# Patient Record
Sex: MALE | Race: BLACK OR AFRICAN AMERICAN | NOT HISPANIC OR LATINO | Employment: UNEMPLOYED | URBAN - METROPOLITAN AREA
[De-identification: names, ages, dates, MRNs, and addresses within clinical notes are randomized per-mention and may not be internally consistent; named-entity substitution may affect disease eponyms.]

---

## 2018-10-30 ENCOUNTER — HOSPITAL ENCOUNTER (EMERGENCY)
Facility: HOSPITAL | Age: 33
Discharge: HOME | End: 2018-10-31
Attending: EMERGENCY MEDICINE | Admitting: EMERGENCY MEDICINE
Payer: COMMERCIAL

## 2018-10-30 VITALS
SYSTOLIC BLOOD PRESSURE: 126 MMHG | HEART RATE: 73 BPM | BODY MASS INDEX: 20.07 KG/M2 | HEIGHT: 72 IN | DIASTOLIC BLOOD PRESSURE: 64 MMHG | TEMPERATURE: 97.2 F | OXYGEN SATURATION: 98 % | RESPIRATION RATE: 16 BRPM | WEIGHT: 148.15 LBS

## 2018-10-30 DIAGNOSIS — N39.0 UTI (URINARY TRACT INFECTION): ICD-10-CM

## 2018-10-30 DIAGNOSIS — R30.0 DYSURIA: Primary | ICD-10-CM

## 2018-10-30 PROCEDURE — 87086 URINE CULTURE/COLONY COUNT: CPT | Performed by: EMERGENCY MEDICINE

## 2018-10-30 PROCEDURE — 99284 EMERGENCY DEPT VISIT MOD MDM: CPT

## 2018-10-30 PROCEDURE — 81003 URINALYSIS AUTO W/O SCOPE: CPT | Performed by: EMERGENCY MEDICINE

## 2018-10-30 PROCEDURE — 81001 URINALYSIS AUTO W/SCOPE: CPT | Performed by: EMERGENCY MEDICINE

## 2018-10-31 LAB
BACTERIA UR QL AUTO: ABNORMAL /HPF
BILIRUB UR QL STRIP: NEGATIVE
CLARITY UR: CLEAR
COLOR UR: YELLOW
GLUCOSE UR STRIP-MCNC: NEGATIVE MG/DL
HGB UR QL STRIP.AUTO: NEGATIVE
KETONES UR STRIP-MCNC: NEGATIVE MG/DL
LEUKOCYTE ESTERASE UR QL STRIP: 500
NITRITE UR QL STRIP: NEGATIVE
NON-SQ EPI CELLS URNS QL MICRO: ABNORMAL /HPF
PH UR STRIP.AUTO: 6.5 [PH] (ref 4.5–8)
PROT UR STRIP-MCNC: NEGATIVE MG/DL
RBC #/AREA URNS AUTO: ABNORMAL /HPF
SP GR UR STRIP.AUTO: 1.01 (ref 1–1.04)
UROBILINOGEN UA: 1 MG/DL
WBC #/AREA URNS AUTO: ABNORMAL /HPF

## 2018-10-31 RX ORDER — CEPHALEXIN 500 MG/1
500 CAPSULE ORAL 3 TIMES DAILY
Qty: 15 CAPSULE | Refills: 0 | Status: SHIPPED | OUTPATIENT
Start: 2018-10-31 | End: 2018-11-05

## 2018-10-31 RX ORDER — CEPHALEXIN 500 MG/1
CAPSULE ORAL
Status: COMPLETED
Start: 2018-10-31 | End: 2018-10-31

## 2018-10-31 RX ORDER — CEPHALEXIN 500 MG/1
500 CAPSULE ORAL ONCE
Status: COMPLETED | OUTPATIENT
Start: 2018-10-31 | End: 2018-10-31

## 2018-10-31 RX ADMIN — CEPHALEXIN 500 MG: 500 CAPSULE ORAL at 00:25

## 2018-10-31 NOTE — ED PROVIDER NOTES
History  Chief Complaint   Patient presents with    Painful Urination    Abdominal Pain     35 yo male who has had 2 days of intermittent dysuria - more towards end of urination  Denies frequency or flank pain, no fevers or chills  Mild suprapubic pressure  History provided by:  Patient  Difficulty Urinating   Presenting symptoms: dysuria    Context: after urination and during urination    Relieved by:  Nothing  Worsened by:  Urination  Ineffective treatments:  None tried  Associated symptoms: abdominal pain (suprapubic pressure)    Associated symptoms: no diarrhea, no fever, no flank pain, no genital itching, no genital lesions, no genital rash, no groin pain, no hematuria, no nausea, no penile redness, no scrotal swelling, no urinary frequency, no urinary hesitation, no urinary incontinence, no urinary retention and no vomiting        None       Past Medical History:   Diagnosis Date    Anxiety     Asthma        Past Surgical History:   Procedure Laterality Date    NO PAST SURGERIES         History reviewed  No pertinent family history  I have reviewed and agree with the history as documented  Social History   Substance Use Topics    Smoking status: Never Smoker    Smokeless tobacco: Never Used    Alcohol use Yes      Comment: socially        Review of Systems   Constitutional: Negative for chills and fever  HENT: Negative for congestion and sore throat  Eyes: Negative for visual disturbance  Respiratory: Negative for shortness of breath and wheezing  Cardiovascular: Negative for chest pain and palpitations  Gastrointestinal: Positive for abdominal pain (suprapubic pressure)  Negative for diarrhea, nausea and vomiting  Genitourinary: Positive for dysuria  Negative for bladder incontinence, flank pain, frequency, hematuria, hesitancy and scrotal swelling  Musculoskeletal: Negative for neck pain and neck stiffness  Skin: Negative for pallor and rash     Neurological: Negative for headaches  Psychiatric/Behavioral: Negative for confusion  All other systems reviewed and are negative  Physical Exam  Physical Exam   Constitutional: He is oriented to person, place, and time  He appears well-developed and well-nourished  No distress  HENT:   Head: Normocephalic and atraumatic  Mouth/Throat: Oropharynx is clear and moist    Neck: Neck supple  Cardiovascular: Normal rate and regular rhythm  No murmur heard  Pulmonary/Chest: Effort normal and breath sounds normal    Abdominal: Soft  Bowel sounds are normal  He exhibits no distension  There is tenderness (mild surapubic)  Musculoskeletal: Normal range of motion  He exhibits no edema  No CVA tenderness   Neurological: He is alert and oriented to person, place, and time  Skin: Skin is warm  Capillary refill takes less than 2 seconds  No rash noted  No pallor  Psychiatric: He has a normal mood and affect  His behavior is normal    Nursing note and vitals reviewed  Vital Signs  ED Triage Vitals [10/30/18 2334]   Temperature Pulse Respirations Blood Pressure SpO2   (!) 97 2 °F (36 2 °C) 73 16 126/64 98 %      Temp Source Heart Rate Source Patient Position - Orthostatic VS BP Location FiO2 (%)   Tympanic Monitor Lying Left arm --      Pain Score       4           Vitals:    10/30/18 2334   BP: 126/64   Pulse: 73   Patient Position - Orthostatic VS: Lying       Visual Acuity      ED Medications  Medications   cephalexin (KEFLEX) capsule 500 mg (500 mg Oral Given 10/31/18 0025)       Diagnostic Studies  Results Reviewed     Procedure Component Value Units Date/Time    Urine Microscopic [[id number]]  (Abnormal) Collected:  10/30/18 2351    Lab Status:  Final result Specimen:  Urine from Urine, Clean Catch Updated:  10/31/18 0010     RBC, UA None Seen /hpf      WBC, UA 10-20 (A) /hpf      Epithelial Cells Occasional /hpf      Bacteria, UA Occasional /hpf     Urine culture [[id number]] Collected:  10/30/18 2351    Lab Status:   In process Specimen:  Urine from Urine, Clean Catch Updated:  10/31/18 0009    UA w Reflex to Microscopic w Reflex to Culture [[id number]]  (Abnormal) Collected:  10/30/18 1451    Lab Status:  Final result Specimen:  Urine from Urine, Clean Catch Updated:  10/31/18 0007     Color, UA Yellow     Clarity, UA Clear     Specific Gravity, UA 1 015     pH, UA 6 5     Leukocytes,  0 (A)     Nitrite, UA Negative     Protein, UA Negative mg/dl      Glucose, UA Negative mg/dl      Ketones, UA Negative mg/dl      Bilirubin, UA Negative     Blood, UA Negative     UROBILINOGEN UA 1 0 mg/dL                  No orders to display              Procedures  Procedures       Phone Contacts  ED Phone Contact    ED Course  ED Course as of Oct 31 0232   Tue Oct 30, 2018   2338 Pt seen and examined  35 yo male who has had 2 days of intermittent dysuria - more towards end of urination  Denies frequency or flank pain, no fevers or chills  Mild suprapubic pressure  Will dip urine  Sexually active with 1 female partner - no known STD's  Wed Oct 31, 2018   0009 Luekocyts 500 - will treat with keflex  MDM  CritCare Time    Disposition  Final diagnoses:   Dysuria   UTI (urinary tract infection)     Time reflects when diagnosis was documented in both MDM as applicable and the Disposition within this note     Time User Action Codes Description Comment    10/31/2018 12:09 AM Robinson BROWER Add [R30 0] Dysuria     10/31/2018 12:09 AM Robinson BROWER Add [N39 0] UTI (urinary tract infection)       ED Disposition     ED Disposition Condition Comment    Discharge  Elsie Crain discharge to home/self care      Condition at discharge: Good        Follow-up Information     Follow up With Specialties Details Why Contact Info Additional [address] [facility] Schedule an appointment as soon as possible for a visit As needed [address] [facility] [address] [facility] ADONIS Many [address] [address]  [address]  [facility] [address], [place], [place], [address]          Discharge Medication List as of 10/31/2018 12:10 AM      START taking these medications    Details   cephalexin (KEFLEX) 500 mg capsule Take 1 capsule (500 mg total) by mouth 3 (three) times a day for 5 days, Starting Wed 10/31/2018, Until Mon 11/5/2018, Print           No discharge procedures on file      ED Provider  Electronically Signed by           Romeo Licona DO  10/31/18 0218

## 2018-10-31 NOTE — DISCHARGE INSTRUCTIONS
Dysuria   WHAT YOU NEED TO KNOW:   Dysuria is difficulty urinating, or pain, burning, or discomfort with urination  Dysuria is usually a symptom of another problem  DISCHARGE INSTRUCTIONS:   Return to the emergency department if:   · You have severe back, side, or abdominal pain  · You have fever and shaking chills  · You vomit several times in a row  Contact your healthcare provider if:   · Your symptoms do not go away, even after treatment  · You have questions or concerns about your condition or care  Medicines:   · Medicines  may be given to help treat a bacterial infection or help decrease bladder spasms  · Take your medicine as directed  Contact your healthcare provider if you think your medicine is not helping or if you have side effects  Tell him of her if you are allergic to any medicine  Keep a list of the medicines, vitamins, and herbs you take  Include the amounts, and when and why you take them  Bring the list or the pill bottles to follow-up visits  Carry your medicine list with you in case of an emergency  Follow up with your healthcare provider as directed: Your healthcare provider may also refer you to a urologist or nephrologist to have additional testing  Write down your questions so you remember to ask them during your visits  Manage your dysuria:   · Drink more liquids  Liquids help flush out bacteria that may be causing an infection  Ask your healthcare provider how much liquid to drink each day and which liquids are best for you  · Take sitz baths as directed  Fill a bathtub with 4 to 6 inches of warm water  You may also use a sitz bath pan that fits over a toilet  Sit in the sitz bath for 20 minutes  Do this 2 to 3 times a day, or as directed  The warm water can help decrease pain and swelling  © 2017 [employer] Information is for End User's use only and may not be sold, redistributed or otherwise used for commercial purposes   All illustrations and images included in CareNotes® are the copyrighted property of A D A M , Inc  or Chris Menon  The above information is an  only  It is not intended as medical advice for individual conditions or treatments  Talk to your doctor, nurse or pharmacist before following any medical regimen to see if it is safe and effective for you  Urinary Tract Infection in Men   WHAT YOU NEED TO KNOW:   A urinary tract infection (UTI) is caused by bacteria that get inside your urinary tract  Most bacteria that enter your urinary tract come out when you urinate  If the bacteria stay in your urinary tract, you may get an infection  Your urinary tract includes your kidneys, ureters, bladder, and urethra  Urine is made in your kidneys, and it flows from the ureters to the bladder  Urine leaves the bladder through the urethra  A UTI is more common in your lower urinary tract, which includes your bladder and urethra  DISCHARGE INSTRUCTIONS:   Seek care immediately if:   · You are urinating very little or not at all  · You have a high fever with shaking chills  · You have side or back pain that gets worse  Contact your healthcare provider if:   · You have a mild fever  · You do not feel better after 2 days of taking antibiotics  · You are vomiting  · You have new symptoms, such as blood or pus in your urine  · You have questions or concerns about your condition or care  Medicines:   · Antibiotics  help fight a bacterial infection  · Medicines  may be given to decrease pain and burning when you urinate  They will also help decrease the feeling that you need to urinate often  These medicines will make your urine orange or red  · Take your medicine as directed  Contact your healthcare provider if you think your medicine is not helping or if you have side effects  Tell him or her if you are allergic to any medicine  Keep a list of the medicines, vitamins, and herbs you take  Include the amounts, and when and why you take them  Bring the list or the pill bottles to follow-up visits  Carry your medicine list with you in case of an emergency  Prevent another UTI:   · Empty your bladder often  Urinate and empty your bladder as soon as you feel the need  Do not hold your urine for long periods of time  · Drink liquids as directed  Ask how much liquid to drink each day and which liquids are best for you  You may need to drink more liquids than usual to help flush out the bacteria  Do not drink alcohol, caffeine, or citrus juices  These can irritate your bladder and increase your symptoms  Your healthcare provider may recommend cranberry juice to help prevent a UTI  · Urinate after you have sex  This can help flush out bacteria passed during sex  · Do pelvic muscle exercises often  Pelvic muscle exercises may help you start and stop urinating  Strong pelvic muscles may help you empty your bladder easier  Squeeze these muscles tightly for 5 seconds like you are trying to hold back urine  Then relax for 5 seconds  Gradually work up to squeezing for 10 seconds  Do 3 sets of 15 repetitions a day, or as directed  Follow up with your healthcare provider as directed:  Write down your questions so you remember to ask them during your visits  © 2017 [employer]  Information is for End User's use only and may not be sold, redistributed or otherwise used for commercial purposes  All illustrations and images included in CareNotes® are the copyrighted property of A D A M , Inc  or Chris Menon  The above information is an  only  It is not intended as medical advice for individual conditions or treatments  Talk to your doctor, nurse or pharmacist before following any medical regimen to see if it is safe and effective for you

## 2018-10-31 NOTE — ED TRIAGE NOTES
Patient state's that he has pain with urination, it is a burning type pain  He also has lower abdominal pain that started this morning, denies N/V/D, is able to eat and drink, moved his bowels today and moves them daily

## 2018-11-01 LAB — BACTERIA UR CULT: NORMAL

## 2019-01-24 ENCOUNTER — APPOINTMENT (EMERGENCY)
Dept: RADIOLOGY | Facility: HOSPITAL | Age: 34
End: 2019-01-24

## 2019-01-24 ENCOUNTER — HOSPITAL ENCOUNTER (EMERGENCY)
Facility: HOSPITAL | Age: 34
Discharge: HOME | End: 2019-01-24
Attending: EMERGENCY MEDICINE | Admitting: EMERGENCY MEDICINE
Payer: COMMERCIAL

## 2019-01-24 VITALS
DIASTOLIC BLOOD PRESSURE: 78 MMHG | TEMPERATURE: 97.9 F | BODY MASS INDEX: 20.34 KG/M2 | RESPIRATION RATE: 18 BRPM | WEIGHT: 150 LBS | SYSTOLIC BLOOD PRESSURE: 126 MMHG | OXYGEN SATURATION: 99 % | HEART RATE: 70 BPM

## 2019-01-24 DIAGNOSIS — R07.9 CHEST PAIN: ICD-10-CM

## 2019-01-24 DIAGNOSIS — R45.0 NERVOUS: Primary | ICD-10-CM

## 2019-01-24 LAB
ALBUMIN SERPL BCP-MCNC: 4.5 G/DL (ref 3.5–5)
ALP SERPL-CCNC: 96 U/L (ref 46–116)
ALT SERPL W P-5'-P-CCNC: 22 U/L (ref 12–78)
ANION GAP SERPL CALCULATED.3IONS-SCNC: 7 MMOL/L (ref 4–13)
AST SERPL W P-5'-P-CCNC: 20 U/L (ref 5–45)
ATRIAL RATE: 60 BPM
BASOPHILS # BLD AUTO: 0.03 THOUSANDS/ΜL (ref 0–0.1)
BASOPHILS NFR BLD AUTO: 0 % (ref 0–1)
BILIRUB SERPL-MCNC: 0.88 MG/DL (ref 0.2–1)
BUN SERPL-MCNC: 12 MG/DL (ref 5–25)
CALCIUM SERPL-MCNC: 9.2 MG/DL (ref 8.3–10.1)
CHLORIDE SERPL-SCNC: 103 MMOL/L (ref 100–108)
CO2 SERPL-SCNC: 28 MMOL/L (ref 21–32)
CREAT SERPL-MCNC: 1.13 MG/DL (ref 0.6–1.3)
EOSINOPHIL # BLD AUTO: 0.1 THOUSAND/ΜL (ref 0–0.61)
EOSINOPHIL NFR BLD AUTO: 1 % (ref 0–6)
ERYTHROCYTE [DISTWIDTH] IN BLOOD BY AUTOMATED COUNT: 12.8 % (ref 11.6–15.1)
GFR SERPL CREATININE-BSD FRML MDRD: 100 ML/MIN/1.73SQ M
GLUCOSE SERPL-MCNC: 82 MG/DL (ref 65–140)
HCT VFR BLD AUTO: 46.1 % (ref 36.5–49.3)
HGB BLD-MCNC: 15.1 G/DL (ref 12–17)
IMM GRANULOCYTES # BLD AUTO: 0.02 THOUSAND/UL (ref 0–0.2)
IMM GRANULOCYTES NFR BLD AUTO: 0 % (ref 0–2)
LYMPHOCYTES # BLD AUTO: 2.52 THOUSANDS/ΜL (ref 0.6–4.47)
LYMPHOCYTES NFR BLD AUTO: 34 % (ref 14–44)
MCH RBC QN AUTO: 29.1 PG (ref 26.8–34.3)
MCHC RBC AUTO-ENTMCNC: 32.8 G/DL (ref 31.4–37.4)
MCV RBC AUTO: 89 FL (ref 82–98)
MONOCYTES # BLD AUTO: 0.69 THOUSAND/ΜL (ref 0.17–1.22)
MONOCYTES NFR BLD AUTO: 9 % (ref 4–12)
NEUTROPHILS # BLD AUTO: 4.1 THOUSANDS/ΜL (ref 1.85–7.62)
NEUTS SEG NFR BLD AUTO: 56 % (ref 43–75)
NRBC BLD AUTO-RTO: 0 /100 WBCS
P AXIS: 66 DEGREES
PLATELET # BLD AUTO: 273 THOUSANDS/UL (ref 149–390)
PMV BLD AUTO: 9.7 FL (ref 8.9–12.7)
POTASSIUM SERPL-SCNC: 4.1 MMOL/L (ref 3.5–5.3)
PR INTERVAL: 148 MS
PROT SERPL-MCNC: 8.1 G/DL (ref 6.4–8.2)
QRS AXIS: 81 DEGREES
QRSD INTERVAL: 80 MS
QT INTERVAL: 410 MS
QTC INTERVAL: 410 MS
RBC # BLD AUTO: 5.19 MILLION/UL (ref 3.88–5.62)
SODIUM SERPL-SCNC: 138 MMOL/L (ref 136–145)
T WAVE AXIS: 65 DEGREES
TROPONIN I SERPL-MCNC: <0.02 NG/ML
TROPONIN I SERPL-MCNC: <0.02 NG/ML
VENTRICULAR RATE: 60 BPM
WBC # BLD AUTO: 7.46 THOUSAND/UL (ref 4.31–10.16)

## 2019-01-24 PROCEDURE — 93010 ELECTROCARDIOGRAM REPORT: CPT | Performed by: INTERNAL MEDICINE

## 2019-01-24 PROCEDURE — 36415 COLL VENOUS BLD VENIPUNCTURE: CPT

## 2019-01-24 PROCEDURE — 93005 ELECTROCARDIOGRAM TRACING: CPT

## 2019-01-24 PROCEDURE — 96372 THER/PROPH/DIAG INJ SC/IM: CPT

## 2019-01-24 PROCEDURE — 84484 ASSAY OF TROPONIN QUANT: CPT | Performed by: EMERGENCY MEDICINE

## 2019-01-24 PROCEDURE — 71046 X-RAY EXAM CHEST 2 VIEWS: CPT

## 2019-01-24 PROCEDURE — 80053 COMPREHEN METABOLIC PANEL: CPT | Performed by: EMERGENCY MEDICINE

## 2019-01-24 PROCEDURE — 85025 COMPLETE CBC W/AUTO DIFF WBC: CPT | Performed by: EMERGENCY MEDICINE

## 2019-01-24 PROCEDURE — 99285 EMERGENCY DEPT VISIT HI MDM: CPT

## 2019-01-24 RX ORDER — KETOROLAC TROMETHAMINE 30 MG/ML
15 INJECTION, SOLUTION INTRAMUSCULAR; INTRAVENOUS ONCE
Status: COMPLETED | OUTPATIENT
Start: 2019-01-24 | End: 2019-01-24

## 2019-01-24 RX ADMIN — KETOROLAC TROMETHAMINE 15 MG: 30 INJECTION, SOLUTION INTRAMUSCULAR at 18:05

## 2019-01-24 NOTE — DISCHARGE INSTRUCTIONS

## 2019-01-24 NOTE — ED ATTENDING ATTESTATION
Jackie Gomes MD, saw and evaluated the patient  All available labs and X-rays were ordered by me or the resident and have been reviewed by myself  I discussed the patient with the resident / non-physician and agree with the resident's / non-physician practitioner's findings and plan as documented in the resident's / non-physician practicitioner's note, except where noted  At this point, I agree with the current assessment done in the ED  Chief Complaint   Patient presents with    Chest Pain     patient reports severe chest pain earlier today that is now resolving    Neck Pain     patient reports neck pain for about a year    Anxiety     patient states he was diagnosed with anxiety a year ago and feels this is still an ongoing problem     This is a 33 y/o M presenting for CP since 9am related to anxiety  Hx of similar  Feels anxious b/c he is in holding, arrested for not paying child support  He was sitting in his bunk and had left chest pain, currently 2/10, at worst 9/10  Seen by PCP in past for similar, todl it was anxiety  No f/ch/n/v  +neck pain  +back pain  He felt mildly SOB earlier today 2/2 the pain  No dizziness/Lh  No diaphoresis  No numbness or tingling down the arms or legs  FH:  - none for sudden death  PMH:  - No hx of VTE   - Anxiety  PSH:  - None  Denies smoking, drinking, drugs  PE:  Vitals:    01/24/19 1549 01/24/19 1846   BP: 130/84 126/78   BP Location: Right arm Right arm   Pulse: 72 70   Resp: 18 18   Temp: 97 9 °F (36 6 °C)    TempSrc: Tympanic    SpO2: 99% 99%   Weight: 68 kg (150 lb)    General: VSS, NAD, awake, alert  Well-nourished, well-developed  Appears stated age  Speaking normally in full sentences  Head: Normocephalic, atraumatic, nontender  Eyes: PERRL, EOM-I  No diplopia  No hyphema  No subconjunctival hemorrhages  Symmetrical lids  ENT: Atraumatic external nose and ears  MMM  No malocclusion  No stridor  Normal phonation  No drooling   Normal swallowing  Neck: Symmetric, trachea midline  No JVD  CV: RRR  +S1/S2  No murmurs or gallops  Peripheral pulses +2 throughout  No chest wall tenderness  Lungs:   Unlabored No retractions  CTAB, lungs sounds equal bilateral    No tachypnea  Abd: +BS, soft, NT/ND    MSK:   FROM   Back:   No rashes  Skin: Dry, intact  Neuro: AAOx3, GCS 15, CN II-XII grossly intact  Motor grossly intact  Psychiatric/Behavioral: Appropriate mood and affect   Exam: deferred  A:  - CP  P:  - Sounds like anxiety  - The patient is less than 50, has an initial HR < 100, O2 Saturation >95%, no hx of previous VTE, no recent trauma or surgery within 4 weeks, no hemoptylsis, and is not on any exogenous estrogen  The patient has no need for further workup of PE and has  <2% chance of PE  My initial pre-test probability of PE is <15% and PERC Rule criteria are satisfied   - EKG, cardiac  - likeyl DC   - 13 point ROS was performed and all are normal unless stated in the history above  - Nursing note reviewed  Vitals reviewed  - Orders placed by myself and/or advanced practitioner / resident     - Previous chart was reviewed  - No language barrier    - History obtained from patient  - There are no limitations to the history obtained  - Critical care time: Not applicable for this patient  HEART Score = [0]  [0] History = Highly / Moderately / Slightly Suspicious  [0] EKG = Significant STD / Non-specific repolarization / Normal  [0] Age = >65, 45-65, <45  [0] Risk Factors (0, 1-2, 3+): Cholesterol, HTN, DM, Cigarettes, FH, Obesity  [0] Troponin: 3+ x normal, 1-3x normal, <normal    Low Score (0-3 points), risk of MACE of 0 9-1 7%      Procedure Note: EKG  Date/Time: 01/24/19 5:08 PM   Performed by: Khai Tilley  Authorized by: Khai Tilley   Indications / Diagnosis: CP  ECG reviewed by me, the ED Provider: yes   The EKG demonstrates:  Rhythm: HR 60 SRINIVAS normal sinus  Intervals: normal intervals  Axis: normal axis  QRS/Blocks: normal QRS  ST Changes: No acute ST Changes, no STD/STEPIHE  PE risk, Wells score = [0]   (0) clinically suspected DVT - 3 points   (0) alternative diagnosis is less likely than PE - 3 0 points   (0) tachycardia - 1 5 points   (0) immobilization/surgery in previous four weeks - 1 5 points   (0) history of DVT or PE - 1 5 points   (0) hemoptysis - 1 0 points   (0) malignancy (treatment for within 6 months, palliative) - 1 0 points   Interpretation Miguel Muñoz et al  2007 Radiology [id number]):   Score <2 0 - Low (probability 15% based on pooled data)     Final Diagnosis:  1  Nervous    2  Chest pain           Medications   ketorolac (TORADOL) injection 15 mg (15 mg Intramuscular Given 1/24/19 1805)     X-ray chest 2 views   ED Interpretation   No effusions or consolidations bilaterally      Final Result      Normal examination  Workstation performed: [id number]           Orders Placed This Encounter   Procedures    ED ECG Documentation Only    X-ray chest 2 views    Comprehensive metabolic panel    CBC and differential    Troponin I    Troponin I    EKG RESULTS    ECG 12 lead    ECG 12 lead     Labs Reviewed   TROPONIN I - Normal       Result Value Ref Range Status    Troponin I <0 02  <=0 04 ng/mL Final    Comment:   [employer] Chemistry analyzer 99% cutoff is > 0 04 ng/mL in network labs     o cTnI 99% cutoff is useful only when applied to patients in the clinical setting of myocardial ischemia   o cTnI 99% cutoff should be interpreted in the context of clinical history, ECG findings and possibly cardiac imaging to establish correct diagnosis  o cTnI 99% cutoff may be suggestive but clearly not indicative of a coronary event without the clinical setting of myocardial ischemia       TROPONIN I - Normal    Troponin I <0 02  <=0 04 ng/mL Final    Comment:   [employer]ens Chemistry analyzer 99% cutoff is > 0 04 ng/mL in network labs     o cTnI 99% cutoff is useful only when applied to patients in the clinical setting of myocardial ischemia   o cTnI 99% cutoff should be interpreted in the context of clinical history, ECG findings and possibly cardiac imaging to establish correct diagnosis  o cTnI 99% cutoff may be suggestive but clearly not indicative of a coronary event without the clinical setting of myocardial ischemia  COMPREHENSIVE METABOLIC PANEL    Sodium 946  136 - 145 mmol/L Final    Potassium 4 1  3 5 - 5 3 mmol/L Final    Chloride 103  100 - 108 mmol/L Final    CO2 28  21 - 32 mmol/L Final    ANION GAP 7  4 - 13 mmol/L Final    BUN 12  5 - 25 mg/dL Final    Creatinine 1 13  0 60 - 1 30 mg/dL Final    Comment: Standardized to IDMS reference method    Glucose 82  65 - 140 mg/dL Final    Comment:   If the patient is fasting, the ADA then defines impaired fasting glucose as > 100 mg/dL and diabetes as > or equal to 123 mg/dL  Specimen collection should occur prior to Sulfasalazine administration due to the potential for falsely depressed results  Specimen collection should occur prior to Sulfapyridine administration due to the potential for falsely elevated results  Calcium 9 2  8 3 - 10 1 mg/dL Final    AST 20  5 - 45 U/L Final    Comment:   Specimen collection should occur prior to Sulfasalazine administration due to the potential for falsely depressed results  ALT 22  12 - 78 U/L Final    Comment:   Specimen collection should occur prior to Sulfasalazine and/or Sulfapyridine administration due to the potential for falsely depressed results       Alkaline Phosphatase 96  46 - 116 U/L Final    Total Protein 8 1  6 4 - 8 2 g/dL Final    Albumin 4 5  3 5 - 5 0 g/dL Final    Total Bilirubin 0 88  0 20 - 1 00 mg/dL Final    eGFR 100  ml/min/1 73sq m Final    Narrative:     National Kidney Disease Education Program recommendations are as follows:  GFR calculation is accurate only with a steady state creatinine  Chronic Kidney disease less than 60 ml/min/1 73 sq  meters  Kidney failure less than 15 ml/min/1 73 sq  meters  CBC AND DIFFERENTIAL    WBC 7 46  4 31 - 10 16 Thousand/uL Final    RBC 5 19  3 88 - 5 62 Million/uL Final    Hemoglobin 15 1  12 0 - 17 0 g/dL Final    Hematocrit 46 1  36 5 - 49 3 % Final    MCV 89  82 - 98 fL Final    MCH 29 1  26 8 - 34 3 pg Final    MCHC 32 8  31 4 - 37 4 g/dL Final    RDW 12 8  11 6 - 15 1 % Final    MPV 9 7  8 9 - 12 7 fL Final    Platelets 816  492 - 390 Thousands/uL Final    nRBC 0  /100 WBCs Final    Neutrophils Relative 56  43 - 75 % Final    Immat GRANS % 0  0 - 2 % Final    Lymphocytes Relative 34  14 - 44 % Final    Monocytes Relative 9  4 - 12 % Final    Eosinophils Relative 1  0 - 6 % Final    Basophils Relative 0  0 - 1 % Final    Neutrophils Absolute 4 10  1 85 - 7 62 Thousands/µL Final    Immature Grans Absolute 0 02  0 00 - 0 20 Thousand/uL Final    Lymphocytes Absolute 2 52  0 60 - 4 47 Thousands/µL Final    Monocytes Absolute 0 69  0 17 - 1 22 Thousand/µL Final    Eosinophils Absolute 0 10  0 00 - 0 61 Thousand/µL Final    Basophils Absolute 0 03  0 00 - 0 10 Thousands/µL Final   LIGHT BLUE TOP   GOLD TOP ON HOLD     Time reflects when diagnosis was documented in both MDM as applicable and the Disposition within this note     Time User Action Codes Description Comment    1/24/2019  6:45 PM Sierra Pearl Add [R45 0] Nervous     1/24/2019  6:46 PM Dariela Castellano Add [R07 9] Chest pain       ED Disposition     ED Disposition Condition Comment    Discharge  Elsie Crain discharge to home/self care  Condition at discharge: Good      Return precautions were discussed with patient  Patient understands when to return to  Emergency department  Patient agrees to discharge plan and follow up care             Follow-up Information     Follow up With Specialties Details Why Contact Info Additional [address] [facility], DO Internal Medicine Go in 1 day  [address] [facility] Ms[address] Nikhil Palomo [facility] Emergency Department Emergency Medicine Go to As needed, If symptoms worsen [address]  [phone]  ED, [address] [address], [place], [place], [address]        There are no discharge medications for this patient  No discharge procedures on file  None       Portions of the record may have been created with voice recognition software  Occasional wrong word or "sound a like" substitutions may have occurred due to the inherent limitations of voice recognition software  Read the chart carefully and recognize, using context, where substitutions have occurred      Electronically signed by:  Jacque Haney

## 2019-01-24 NOTE — ED PROVIDER NOTES
History  Chief Complaint   Patient presents with    Chest Pain     patient reports severe chest pain earlier today that is now resolving    Neck Pain     patient reports neck pain for about a year    Anxiety     patient states he was diagnosed with anxiety a year ago and feels this is still an ongoing problem     HPI    81-year-old male with history of anxiety presenting with chief complaint chest pain  Patient states he is in holding for not playing his child support  Patient presents from police [facility]  Patient states chest pain began today while he was sitting on a bunk  This approximately started at 8:00 a m  Aminta Blu Patient describes right going stabbing chest pain  It does not radiate  Pain is going away  At [address] slowly built to severe nature now is getting better  Patient did not tried medications  Patient also complains of some neck pain  This has been going on for 1 year  Comes and goes  It is worse with lifting  Patient states that Tylenol makes the pain better  Pain does not radiate  No steroid use  No IV drug use  No numbness or tingling or motor weakness  Patient states he feels right anxious about being under charges for not paying child abuse  Patient thinks this might be is anxiety  Patient was evaluated by primary care physician  was told this chest pain is related to his anxiety  Patient denies fever chills rigors headache lightheadedness dizziness palpitations shortness of breath cough pleurisy abdominal pain nausea vomiting diarrhea constipation urinary symptoms motor weakness numbness and tingling  Patient denies diaphoresis  No history of VTE  Family history is negative for early ACS  No worsening of pain on exertion  None       Past Medical History:   Diagnosis Date    Anxiety     Anxiety     Asthma        Past Surgical History:   Procedure Laterality Date    KNEE SURGERY      NO PAST SURGERIES         History reviewed   No pertinent family history  I have reviewed and agree with the history as documented  Social History   Substance Use Topics    Smoking status: Never Smoker    Smokeless tobacco: Never Used    Alcohol use Yes      Comment: socially        Review of Systems   Respiratory: Negative for apnea, cough, choking, chest tightness, shortness of breath, wheezing and stridor  Cardiovascular: Positive for chest pain  Negative for palpitations and leg swelling  Psychiatric/Behavioral: The patient is nervous/anxious  All other systems reviewed and are negative  Physical Exam  ED Triage Vitals [01/24/19 1549]   Temperature Pulse Respirations Blood Pressure SpO2   97 9 °F (36 6 °C) 72 18 130/84 99 %      Temp Source Heart Rate Source Patient Position - Orthostatic VS BP Location FiO2 (%)   Tympanic Monitor Sitting Right arm --      Pain Score       7           Orthostatic Vital Signs  Vitals:    01/24/19 1549 01/24/19 1846   BP: 130/84 126/78   Pulse: 72 70   Patient Position - Orthostatic VS: Sitting Sitting       Physical Exam   Constitutional: He is oriented to person, place, and time  He appears well-developed and well-nourished  No distress  HENT:   Head: Normocephalic and atraumatic  Right Ear: External ear normal    Left Ear: External ear normal    Nose: Nose normal    Mouth/Throat: Oropharynx is clear and moist  No oropharyngeal exudate  Eyes: Pupils are equal, round, and reactive to light  Conjunctivae and EOM are normal  Right eye exhibits no discharge  Left eye exhibits no discharge  No scleral icterus  Neck: Normal range of motion  Neck supple  No JVD present  No tracheal deviation present  No thyromegaly present  Cardiovascular: Normal rate, regular rhythm, normal heart sounds and intact distal pulses  No murmur heard  Pulmonary/Chest: Effort normal and breath sounds normal  No stridor  No respiratory distress  He has no wheezes  He has no rales  Abdominal: Soft   Bowel sounds are normal  He exhibits no distension and no mass  There is no tenderness  There is no rebound and no guarding  Musculoskeletal: Normal range of motion  He exhibits no edema, tenderness or deformity  Arms:  Homans sign negative bilaterally, no calf erythema or edema  Calves are symmetrical in diameter  Lymphadenopathy:     He has no cervical adenopathy  Neurological: He is alert and oriented to person, place, and time  No cranial nerve deficit  He exhibits normal muscle tone  Coordination normal  GCS eye subscore is 4  GCS verbal subscore is 5  GCS motor subscore is 6  Reflex Scores:       Tricep reflexes are 2+ on the right side and 2+ on the left side  Bicep reflexes are 2+ on the right side and 2+ on the left side  Patellar reflexes are 2+ on the right side and 2+ on the left side  Achilles reflexes are 2+ on the right side and 2+ on the left side  5/5 strength in upper lower extremities, sensation intact throughout, cerebellar testing including finger-to-nose heel-to-shin rapid alternating movements are intact, cranial nerves 2-12 intact, no pronator drift, patient is able to walk without difficulty, speech is articulate visual fields are intact  Skin: Skin is warm and dry  No rash noted  He is not diaphoretic  No erythema  Psychiatric: He has a normal mood and affect  His behavior is normal  Judgment and thought content normal    Nursing note and vitals reviewed        ED Medications  Medications   ketorolac (TORADOL) injection 15 mg (15 mg Intramuscular Given 1/24/19 1805)       Diagnostic Studies  Results Reviewed     Procedure Component Value Units Date/Time    Troponin I [[id number]]  (Normal) Collected:  01/24/19 1806    Lab Status:  Final result Specimen:  Blood from Arm, Left Updated:  01/24/19 1835     Troponin I <0 02 ng/mL     Comprehensive metabolic panel [[id number]] Collected:  01/24/19 1609    Lab Status:  Final result Specimen:  Blood from Arm, Left Updated:  01/24/19 1638     Sodium 138 mmol/L      Potassium 4 1 mmol/L      Chloride 103 mmol/L      CO2 28 mmol/L      ANION GAP 7 mmol/L      BUN 12 mg/dL      Creatinine 1 13 mg/dL      Glucose 82 mg/dL      Calcium 9 2 mg/dL      AST 20 U/L      ALT 22 U/L      Alkaline Phosphatase 96 U/L      Total Protein 8 1 g/dL      Albumin 4 5 g/dL      Total Bilirubin 0 88 mg/dL      eGFR 100 ml/min/1 73sq m     Narrative:         National Kidney Disease Education Program recommendations are as follows:  GFR calculation is accurate only with a steady state creatinine  Chronic Kidney disease less than 60 ml/min/1 73 sq  meters  Kidney failure less than 15 ml/min/1 73 sq  meters  Troponin I [[id number]]  (Normal) Collected:  01/24/19 1609    Lab Status:  Final result Specimen:  Blood from Arm, Left Updated:  01/24/19 1638     Troponin I <0 02 ng/mL     CBC and differential [[id number]] Collected:  01/24/19 1609    Lab Status:  Final result Specimen:  Blood from Arm, Left Updated:  01/24/19 1619     WBC 7 46 Thousand/uL      RBC 5 19 Million/uL      Hemoglobin 15 1 g/dL      Hematocrit 46 1 %      MCV 89 fL      MCH 29 1 pg      MCHC 32 8 g/dL      RDW 12 8 %      MPV 9 7 fL      Platelets 914 Thousands/uL      nRBC 0 /100 WBCs      Neutrophils Relative 56 %      Immat GRANS % 0 %      Lymphocytes Relative 34 %      Monocytes Relative 9 %      Eosinophils Relative 1 %      Basophils Relative 0 %      Neutrophils Absolute 4 10 Thousands/µL      Immature Grans Absolute 0 02 Thousand/uL      Lymphocytes Absolute 2 52 Thousands/µL      Monocytes Absolute 0 69 Thousand/µL      Eosinophils Absolute 0 10 Thousand/µL      Basophils Absolute 0 03 Thousands/µL                  X-ray chest 2 views   ED Interpretation by Lesley Martinez DO (01/24 1724)   No effusions or consolidations bilaterally      Final Result by Mayte Johnson MD (01/24 1858)      Normal examination              Workstation performed: [id number]               Procedures  ECG 12 Lead Documentation  Date/Time: 1/24/2019 8:54 PM  Performed by: Sean Le  Authorized by: Sean Le     Indications / Diagnosis:  Chest pain  ECG reviewed by me, the ED Provider: yes    Patient location:  ED  Interpretation:     Interpretation: normal    Rate:     ECG rate:  60  Rhythm:     Rhythm: sinus rhythm    Ectopy:     Ectopy: none    QRS:     QRS axis:  Normal  Conduction:     Conduction: normal    ST segments:     ST segments:  Normal  T waves:     T waves: normal    Other findings:     Other findings: early repolarization            Phone Consults  ED Phone Contact    ED Course         HEART Risk Score      Most Recent Value   History  0 Filed at: 01/25/2019 1054   ECG  0 Filed at: 01/25/2019 1054   Age  0 Filed at: 01/25/2019 1054   Risk Factors  1 Filed at: 01/25/2019 1054   Troponin  0 Filed at: 01/25/2019 1054   Heart Score Risk Calculator   History  0 Filed at: 01/25/2019 1054   ECG  0 Filed at: 01/25/2019 1054   Age  0 Filed at: 01/25/2019 1054   Risk Factors  1 Filed at: 01/25/2019 1054   Troponin  0 Filed at: 01/25/2019 1054   HEART Score  1 Filed at: 01/25/2019 1054   HEART Score  1 Filed at: 01/25/2019 1054            [address] for PE      Most Recent Value   PERC Rule for PE   Age >=50  0 Filed at: 01/25/2019 1055   HR >=100  0 Filed at: 01/25/2019 1055   O2 Sat on room air < 95%  0 Filed at: 01/25/2019 1055   History of PE or DVT  0 Filed at: 01/25/2019 1055   Recent trauma or surgery  0 Filed at: 01/25/2019 1055   Hemoptysis  0 Filed at: 01/25/2019 1055   Exogenous estrogen  0 Filed at: 01/25/2019 1055   Unilateral leg swelling  0 Filed at: 01/25/2019 1055   [address] for PE Results  0 Filed at: 01/25/2019 1055                Wells' Criteria for PE      Most Recent Value   Wells' Criteria for PE   Clinical signs and symptoms of DVT  0 Filed at: 01/25/2019 1055   PE is primary diagnosis or equally likely  0 Filed at: 01/25/2019 1055   HR >100  0 Filed at: 01/25/2019 1055 Immobilization at least 3 days or Surgery in the previous 4 weeks  0 Filed at: 01/25/2019 1055   Previous, objectively diagnosed PE or DVT  0 Filed at: 01/25/2019 1055   Hemoptysis  0 Filed at: 01/25/2019 1055   Malignancy with treatment within 6 months or palliative  0 Filed at: 01/25/2019 1055   Wells' Criteria Total  0 Filed at: 01/25/2019 1055            [facility]  Number of Diagnoses or Management Options  Chest pain:   Nervous:   Diagnosis management comments: 74-year-old male presenting with chief complaint of chest pain  Patient is presenting from [employer]  On exam vital signs are normal   Patient has a normal physical exam with no signs of DVT  Differential diagnosis includes but not limited to arrhythmia, pneumothorax, cardiac ischemia, musculoskeletal chest pain, anxiety  EKG nonischemic benign early refill, troponin negative doubt ischemia at this time  Heart score 1 delta troponin negative  Patient is perc negative, doubt pulmonary embolism at this time  Patient as wells low risk  Chest x-ray not suggestive of pneumothorax  Suspect anxiety versus musculoskeletal chest pain  Patient given Toradol  Patient felt better  Patient asymptomatic on discharge  Patient agrees to follow-up care with PCP in next 1-2 days  Patient demonstrates understanding  ED return precautions discussed  CritCare Time    Disposition  Final diagnoses:   Nervous   Chest pain     Time reflects when diagnosis was documented in both MDM as applicable and the Disposition within this note     Time User Action Codes Description Comment    1/24/2019  6:45 PM Tiago Gayle [R45 0] Nervous     1/24/2019  6:46 PM Cristy Hughes [R07 9] Chest pain       ED Disposition     ED Disposition Condition Comment    Discharge  Pham [place] discharge to home/self care  Condition at discharge: Good      Return precautions were discussed with patient  Patient understands when to return to  Emergency department  Patient agrees to discharge plan and follow up care  Follow-up Information     Follow up With Specialties Details Why Contact Info Additional [address] [facility], DO Internal Medicine Go in 1 day  [facility]  [phone]       [address] [place] [address] Emergency Department Emergency Medicine Go to As needed, If symptoms worsen [address]  [phone]  ED, [address] [place] [address], [facility]          There are no discharge medications for this patient  No discharge procedures on file  ED Provider  Attending physically available and evaluated Yaakov Santizo I managed the patient along with the ED Attending      Electronically Signed by         Luis Eduardo Bain DO  01/25/19 5442

## 2021-08-31 ENCOUNTER — HOSPITAL ENCOUNTER (EMERGENCY)
Facility: HOSPITAL | Age: 36
Discharge: HOME | End: 2021-08-31
Attending: EMERGENCY MEDICINE | Admitting: EMERGENCY MEDICINE

## 2021-08-31 VITALS
SYSTOLIC BLOOD PRESSURE: 144 MMHG | WEIGHT: 146.8 LBS | RESPIRATION RATE: 18 BRPM | DIASTOLIC BLOOD PRESSURE: 85 MMHG | TEMPERATURE: 98.2 F | BODY MASS INDEX: 19.91 KG/M2 | HEART RATE: 84 BPM | OXYGEN SATURATION: 99 %

## 2021-08-31 DIAGNOSIS — A64 STD (MALE): Primary | ICD-10-CM

## 2021-08-31 LAB
BILIRUB UR QL STRIP: NEGATIVE
CLARITY UR: CLEAR
COLOR UR: NORMAL
GLUCOSE UR STRIP-MCNC: NEGATIVE MG/DL
HGB UR QL STRIP.AUTO: NEGATIVE
KETONES UR STRIP-MCNC: NEGATIVE MG/DL
LEUKOCYTE ESTERASE UR QL STRIP: NEGATIVE
NITRITE UR QL STRIP: NEGATIVE
PH UR STRIP.AUTO: 7 [PH]
PROT UR STRIP-MCNC: NEGATIVE MG/DL
SP GR UR STRIP.AUTO: 1.01 (ref 1–1.04)
UROBILINOGEN UA: NEGATIVE MG/DL

## 2021-08-31 PROCEDURE — 81003 URINALYSIS AUTO W/O SCOPE: CPT | Performed by: PHYSICIAN ASSISTANT

## 2021-08-31 PROCEDURE — 87591 N.GONORRHOEAE DNA AMP PROB: CPT | Performed by: PHYSICIAN ASSISTANT

## 2021-08-31 PROCEDURE — 99284 EMERGENCY DEPT VISIT MOD MDM: CPT | Performed by: PHYSICIAN ASSISTANT

## 2021-08-31 PROCEDURE — 96372 THER/PROPH/DIAG INJ SC/IM: CPT

## 2021-08-31 PROCEDURE — 87491 CHLMYD TRACH DNA AMP PROBE: CPT | Performed by: PHYSICIAN ASSISTANT

## 2021-08-31 PROCEDURE — 99283 EMERGENCY DEPT VISIT LOW MDM: CPT

## 2021-08-31 RX ORDER — CEFTRIAXONE 500 MG/1
INJECTION, POWDER, FOR SOLUTION INTRAMUSCULAR; INTRAVENOUS
Status: COMPLETED
Start: 2021-08-31 | End: 2021-08-31

## 2021-08-31 RX ORDER — LIDOCAINE HYDROCHLORIDE 10 MG/ML
INJECTION, SOLUTION EPIDURAL; INFILTRATION; INTRACAUDAL; PERINEURAL
Status: COMPLETED
Start: 2021-08-31 | End: 2021-08-31

## 2021-08-31 RX ORDER — DOXYCYCLINE HYCLATE 100 MG/1
100 CAPSULE ORAL 2 TIMES DAILY
Qty: 20 CAPSULE | Refills: 0 | Status: SHIPPED | OUTPATIENT
Start: 2021-08-31 | End: 2021-09-10

## 2021-08-31 RX ORDER — AZITHROMYCIN 250 MG/1
1000 TABLET, FILM COATED ORAL ONCE
Status: COMPLETED | OUTPATIENT
Start: 2021-08-31 | End: 2021-08-31

## 2021-08-31 RX ADMIN — AZITHROMYCIN MONOHYDRATE 1000 MG: 250 TABLET ORAL at 14:48

## 2021-08-31 RX ADMIN — LIDOCAINE HYDROCHLORIDE 500 MG: 10 INJECTION, SOLUTION EPIDURAL; INFILTRATION; INTRACAUDAL; PERINEURAL at 14:50

## 2021-08-31 NOTE — ED PROVIDER NOTES
History  Chief Complaint   Patient presents with    Urinary Frequency     Pt with burning with urination for several days, pt concerned about std       Difficulty Urinating  Presenting symptoms: dysuria    Relieved by:  Nothing  Worsened by:  Nothing  Ineffective treatments:  None tried  Associated symptoms: no abdominal pain    Risk factors: no bladder surgery        None       Past Medical History:   Diagnosis Date    Anxiety     Anxiety     Asthma        Past Surgical History:   Procedure Laterality Date    KNEE SURGERY      NO PAST SURGERIES         History reviewed  No pertinent family history  I have reviewed and agree with the history as documented  E-Cigarette/Vaping     E-Cigarette/Vaping Substances     Social History     Tobacco Use    Smoking status: Never Smoker    Smokeless tobacco: Never Used   Substance Use Topics    Alcohol use: Yes     Comment: socially    Drug use: Yes     Types: Marijuana     Comment: socially       Review of Systems   Constitutional: Negative  HENT: Negative  Eyes: Negative  Respiratory: Negative  Cardiovascular: Negative  Gastrointestinal: Negative  Negative for abdominal pain  Endocrine: Negative  Genitourinary: Positive for dysuria  Musculoskeletal: Negative  Skin: Negative  Allergic/Immunologic: Negative  Neurological: Negative  Hematological: Negative  Psychiatric/Behavioral: Negative  All other systems reviewed and are negative  Physical Exam  Physical Exam  Vitals and nursing note reviewed  Constitutional:       Appearance: Normal appearance  He is normal weight  HENT:      Head: Normocephalic and atraumatic  Right Ear: Tympanic membrane, ear canal and external ear normal       Left Ear: Tympanic membrane, ear canal and external ear normal       Nose: Nose normal       Mouth/Throat:      Mouth: Mucous membranes are moist       Pharynx: Oropharynx is clear     Eyes:      Extraocular Movements: Extraocular movements intact  Conjunctiva/sclera: Conjunctivae normal       Pupils: Pupils are equal, round, and reactive to light  Cardiovascular:      Rate and Rhythm: Normal rate and regular rhythm  Pulses: Normal pulses  Heart sounds: Normal heart sounds  Pulmonary:      Effort: Pulmonary effort is normal       Breath sounds: Normal breath sounds  Abdominal:      General: Abdomen is flat  Bowel sounds are normal    Musculoskeletal:         General: Normal range of motion  Cervical back: Normal range of motion and neck supple  Skin:     General: Skin is warm  Capillary Refill: Capillary refill takes less than 2 seconds  Neurological:      General: No focal deficit present  Mental Status: He is alert and oriented to person, place, and time           Vital Signs  ED Triage Vitals [08/31/21 1345]   Temperature Pulse Respirations Blood Pressure SpO2   98 2 °F (36 8 °C) 84 18 144/85 99 %      Temp Source Heart Rate Source Patient Position - Orthostatic VS BP Location FiO2 (%)   Tympanic Monitor Sitting Left arm --      Pain Score       3           Vitals:    08/31/21 1345   BP: 144/85   Pulse: 84   Patient Position - Orthostatic VS: Sitting         Visual Acuity      ED Medications  Medications   cefTRIAXone (ROCEPHIN) 500 mg in lidocaine (PF) (XYLOCAINE-MPF) 1 % IM only syringe (500 mg Intramuscular Given 8/31/21 1450)   azithromycin (ZITHROMAX) tablet 1,000 mg (1,000 mg Oral Given 8/31/21 1448)   cefTRIAXone (ROCEPHIN) 500 mg injection **ADS Override Pull** (  Override Pull 8/31/21 1452)   lidocaine (PF) (XYLOCAINE-MPF) 1 % injection **ADS Override Pull** (  Override Pull 8/31/21 1452)       Diagnostic Studies  Results Reviewed     Procedure Component Value Units Date/Time    UA w Reflex to Microscopic w Reflex to Culture [[id number]]  (Normal) Collected: 08/31/21 1402    Lab Status: Final result Specimen: Urine, Other Updated: 08/31/21 1428     Color, UA Straw     Clarity, UA Clear Specific [place], UA 1 010     pH, UA 7 0     Leukocytes, UA Negative     Nitrite, UA Negative     Protein, UA Negative mg/dl      Glucose, UA Negative mg/dl      Ketones, UA Negative mg/dl      Bilirubin, UA Negative     Blood, UA Negative     UROBILINOGEN UA Negative mg/dL     Chlamydia/GC amplified DNA by PCR [[id number]] Collected: 08/31/21 1406    Lab Status: In process Specimen: Urine, Other Updated: 08/31/21 1420                 No orders to display              Procedures  Procedures         ED Course                                           MDM    Disposition  Final diagnoses:   STD (male)     Time reflects when diagnosis was documented in both MDM as applicable and the Disposition within this note     Time User Action Codes Description Comment    8/31/2021  2:34 PM Flynn Escobar  Add [A64] STD (male)       ED Disposition     ED Disposition Condition Date/Time Comment    Discharge Stable Tue Aug 31, 2021  2:34 PM Thomas Ibarra discharge to home/self care  Follow-up Information     Follow up With Specialties Details Why [address] Larry MD Family Medicine   [address] [place], [place] [phone]            Discharge Medication List as of 8/31/2021  2:36 PM      START taking these medications    Details   doxycycline hyclate (VIBRAMYCIN) 100 mg capsule Take 1 capsule (100 mg total) by mouth 2 (two) times a day for 10 days, Starting Tue 8/31/2021, Until Fri 9/10/2021, Print           No discharge procedures on file      PDMP Review     None          ED Provider  Electronically Signed by           Patrica Woodward PA-C  08/31/21 5649

## 2021-09-01 LAB
C TRACH DNA SPEC QL NAA+PROBE: NEGATIVE
N GONORRHOEA DNA SPEC QL NAA+PROBE: NEGATIVE

## 2023-11-19 ENCOUNTER — HOSPITAL ENCOUNTER (EMERGENCY)
Facility: HOSPITAL | Age: 38
Discharge: HOME | End: 2023-11-20
Attending: EMERGENCY MEDICINE
Payer: COMMERCIAL

## 2023-11-19 VITALS
RESPIRATION RATE: 18 BRPM | HEART RATE: 71 BPM | DIASTOLIC BLOOD PRESSURE: 76 MMHG | TEMPERATURE: 97.5 F | SYSTOLIC BLOOD PRESSURE: 115 MMHG | OXYGEN SATURATION: 98 %

## 2023-11-19 DIAGNOSIS — K08.89 DENTALGIA: Primary | ICD-10-CM

## 2023-11-19 PROCEDURE — 99282 EMERGENCY DEPT VISIT SF MDM: CPT

## 2023-11-19 PROCEDURE — 99284 EMERGENCY DEPT VISIT MOD MDM: CPT | Performed by: EMERGENCY MEDICINE

## 2023-11-19 RX ORDER — NAPROXEN 500 MG/1
500 TABLET ORAL 2 TIMES DAILY WITH MEALS
Qty: 30 TABLET | Refills: 0 | Status: SHIPPED | OUTPATIENT
Start: 2023-11-19

## 2023-11-19 RX ORDER — AMOXICILLIN AND CLAVULANATE POTASSIUM 875; 125 MG/1; MG/1
1 TABLET, FILM COATED ORAL ONCE
Status: COMPLETED | OUTPATIENT
Start: 2023-11-20 | End: 2023-11-20

## 2023-11-19 RX ORDER — ACETAMINOPHEN 325 MG/1
975 TABLET ORAL ONCE
Status: COMPLETED | OUTPATIENT
Start: 2023-11-20 | End: 2023-11-20

## 2023-11-19 NOTE — Clinical Note
Debby Crawley was seen and treated in our emergency department on 11/19/2023. No restrictions            Diagnosis: Kenny Terry  may return to work on return date. He may return on this date: 11/22/2023         If you have any questions or concerns, please don't hesitate to call.       Silke Middleton, DO    ______________________________           _______________          _______________  Hospital Representative                              Date                                Time

## 2023-11-20 RX ORDER — AMOXICILLIN AND CLAVULANATE POTASSIUM 875; 125 MG/1; MG/1
1 TABLET, FILM COATED ORAL EVERY 12 HOURS
Qty: 14 TABLET | Refills: 0 | Status: SHIPPED | OUTPATIENT
Start: 2023-11-20 | End: 2023-11-27

## 2023-11-20 RX ADMIN — ACETAMINOPHEN 975 MG: 325 TABLET, FILM COATED ORAL at 00:04

## 2023-11-20 RX ADMIN — AMOXICILLIN AND CLAVULANATE POTASSIUM 1 TABLET: 875; 125 TABLET, FILM COATED ORAL at 00:04

## 2023-11-20 NOTE — ED ATTENDING ATTESTATION
11/19/2023  ICelio MD, saw and evaluated the patient. I have discussed the patient with the resident/non-physician practitioner and agree with the resident's/non-physician practitioner's findings, Plan of Care, and MDM as documented in the resident's/non-physician practitioner's note, except where noted. All available labs and Radiology studies were reviewed. I was present for key portions of any procedure(s) performed by the resident/non-physician practitioner and I was immediately available to provide assistance. At this point I agree with the current assessment done in the Emergency Department. I have conducted an independent evaluation of this patient a history and physical is as follows:    ED Course  ED Course as of 11/20/23 Pollo Jose Nov 19, 2023   2351 Per resident h&p 40 YO M presents for evaluation of dentalgia; recurrent related to a tooth extraction years ago O: M in nad; L maxiallary swelling; cavities I/P acute dentalgia; [address]; pain control f/u dentist     Emergency Department Note- Adelfo Nunez 41 y.o. male MRN: [id number]    Unit/Bed#: [facility] Encounter: [id number]    Adelfo Nunez is a 41 y.o. male who presents with   Chief Complaint   Patient presents with    Dental Pain     Dental pain to upper left jaw x a few days, swelling to the area since a few hours ago. History of Present Illness   HPI:  Adelfo Nunez is a 41 y.o. male who presents for evaluation of:  Left upper dentalgia with associated soft tissue swelling over the last several days. Patient notes that he has had a tooth extraction in the past in that area. He denies any associated fevers and chills. Denies trauma to the area. Review of Systems   Constitutional:  Negative for fatigue and fever. HENT:  Positive for facial swelling. Negative for congestion and sore throat. Respiratory:  Negative for cough and shortness of breath. Cardiovascular:  Negative for chest pain and palpitations. Gastrointestinal:  Negative for abdominal pain and nausea. Genitourinary:  Negative for flank pain and frequency. Neurological:  Negative for light-headedness and headaches. Psychiatric/Behavioral:  Negative for dysphoric mood and hallucinations. All other systems reviewed and are negative. Historical Information   Past Medical History:   Diagnosis Date    Anxiety     Anxiety     Asthma      Past Surgical History:   Procedure Laterality Date    KNEE SURGERY      NO PAST SURGERIES       Social History   Social History     Substance and Sexual Activity   Alcohol Use Yes    Comment: socially     Social History     Substance and Sexual Activity   Drug Use Yes    Types: Marijuana    Comment: socially     Social History     Tobacco Use   Smoking Status Never   Smokeless Tobacco Never     Family History: History reviewed. No pertinent family history. Meds/Allergies   PTA meds:   None     Allergies   Allergen Reactions    Shellfish-Derived Products - Food Allergy        Objective   First Vitals:   Blood Pressure: 115/76 (11/19/23 2336)  Pulse: 71 (11/19/23 2336)  Temperature: 97.5 °F (36.4 °C) (11/19/23 2336)  Temp Source: Oral (11/19/23 2336)  Respirations: 18 (11/19/23 2336)  SpO2: 98 % (11/19/23 2336)    Current Vitals:   Blood Pressure: 115/76 (11/19/23 2336)  Pulse: 71 (11/19/23 2336)  Temperature: 97.5 °F (36.4 °C) (11/19/23 2336)  Temp Source: Oral (11/19/23 2336)  Respirations: 18 (11/19/23 2336)  SpO2: 98 % (11/19/23 2336)    No intake or output data in the 24 hours ending 11/20/23 0022    Invasive Devices       None                   Physical Exam  Vitals and nursing note reviewed. Constitutional:       General: He is not in acute distress. Appearance: Normal appearance. He is well-developed. HENT:      Head: Normocephalic and atraumatic.         Right Ear: External ear normal.      Left Ear: External ear normal.      Nose: Nose normal.      Mouth/Throat:      Pharynx: No oropharyngeal exudate. Eyes:      Conjunctiva/sclera: Conjunctivae normal.      Pupils: Pupils are equal, round, and reactive to light. Cardiovascular:      Rate and Rhythm: Normal rate and regular rhythm. Pulmonary:      Effort: Pulmonary effort is normal. No respiratory distress. Abdominal:      General: Abdomen is flat. There is no distension. Palpations: Abdomen is soft. Musculoskeletal:         General: No deformity. Normal range of motion. Cervical back: Normal range of motion and neck supple. Skin:     General: Skin is warm and dry. Capillary Refill: Capillary refill takes less than 2 seconds. Neurological:      General: No focal deficit present. Mental Status: He is alert and oriented to person, place, and time. Mental status is at baseline. Coordination: Coordination normal.   Psychiatric:         Mood and Affect: Mood normal.         Behavior: Behavior normal.         Thought Content: Thought content normal.         Judgment: Judgment normal.           Medical Decision Makin. Acute dentalgia with associated left facial swelling: Oral antibiotics to treat infection; follow-up with dental clinic. No results found for this or any previous visit (from the past 36 hour(s)). No orders to display         Portions of the record may have been created with voice recognition software. Occasional wrong word or "sound a like" substitutions may have occurred due to the inherent limitations of voice recognition software. Read the chart carefully and recognize, using context, where substitutions have occurred.         Critical Care Time  Procedures

## 2023-11-20 NOTE — ED PROVIDER NOTES
History  Chief Complaint   Patient presents with    Dental Pain     Dental pain to upper left jaw x a few days, swelling to the area since a few hours ago. HPI    Patient is a 41year old male with no significant PMHx, presenting to the ED for evaluation of dental pain to the L upper jaw for the past few days with associated L sided facial swelling noticed a few hours ago. Patient reports that he had a dental extraction in the L upper jaw a few years ago and since then, has had intermittent pain and facial swelling. Sometimes facial swelling requires antibiotic, but sometimes it resolves on its own. Patient has not had dental follow up. Patient denies fevers, chills, difficulty eating or swallowing, change of voice, headache, change of vision. Took Motrin at 2230 this evening. None       Past Medical History:   Diagnosis Date    Anxiety     Anxiety     Asthma        Past Surgical History:   Procedure Laterality Date    KNEE SURGERY      NO PAST SURGERIES         History reviewed. No pertinent family history. I have reviewed and agree with the history as documented. E-Cigarette/Vaping     E-Cigarette/Vaping Substances     Social History     Tobacco Use    Smoking status: Never    Smokeless tobacco: Never   Substance Use Topics    Alcohol use: Yes     Comment: socially    Drug use: Yes     Types: Marijuana     Comment: socially        Review of Systems   All other systems reviewed and are negative. Physical Exam  ED Triage Vitals [11/19/23 2336]   Temperature Pulse Respirations Blood Pressure SpO2   97.5 °F (36.4 °C) 71 18 115/76 98 %      Temp Source Heart Rate Source Patient Position - Orthostatic VS BP Location FiO2 (%)   Oral Monitor -- -- --      Pain Score       10 - Worst Possible Pain             Orthostatic Vital Signs  Vitals:    11/19/23 2336   BP: 115/76   Pulse: 71       Physical Exam  Vitals and nursing note reviewed. Constitutional:       General: He is not in acute distress. Appearance: Normal appearance. He is well-developed and normal weight. He is not ill-appearing or toxic-appearing. HENT:      Head: Normocephalic and atraumatic. Right Ear: External ear normal.      Left Ear: External ear normal.      Nose: Nose normal. No congestion or rhinorrhea. Mouth/Throat:      Mouth: Mucous membranes are moist.      Dentition: Dental tenderness and dental caries present. No dental abscesses. Tongue: No lesions. Tongue does not deviate from midline. Pharynx: Oropharynx is clear. No pharyngeal swelling, oropharyngeal exudate, posterior oropharyngeal erythema or uvula swelling. Eyes:      Conjunctiva/sclera: Conjunctivae normal.   Cardiovascular:      Rate and Rhythm: Normal rate and regular rhythm. Heart sounds: No murmur heard. Pulmonary:      Effort: Pulmonary effort is normal. No respiratory distress. Breath sounds: Normal breath sounds. Abdominal:      Palpations: Abdomen is soft. Tenderness: There is no abdominal tenderness. Musculoskeletal:         General: No swelling. Cervical back: Neck supple. Skin:     General: Skin is warm and dry. Capillary Refill: Capillary refill takes less than 2 seconds. Neurological:      Mental Status: He is alert. Psychiatric:         Mood and Affect: Mood normal.         ED Medications  Medications   acetaminophen (TYLENOL) tablet 975 mg (975 mg Oral Given 11/20/23 0004)   amoxicillin-clavulanate (AUGMENTIN) 875-125 mg per tablet 1 tablet (1 tablet Oral Given 11/20/23 0004)       Diagnostic Studies  Results Reviewed       None                   No orders to display         Procedures  Procedures      ED Course       Patient presenting to the ED for evaluation of L sided dental pain and associated facial swelling. Patient denies fevers, chills. Ddx: dentalgia, dental abscess; will treat with Augmentin and Tylenol for pain control. Patient given information of dental clinics in the area.   I gave oral return precautions for what to return for in addition to the written return precautions. The patient (and any family present: wife) verbalized understanding of the discharge instructions and warnings that would necessitate return to the Emergency Department. I specifically highlighted areas of special concern regarding the written and verbal discharge instructions and return precautions. All questions were answered prior to discharge. SBIRT 22yo+      Flowsheet Row Most Recent Value   Initial Alcohol Screen: US AUDIT-C     1. How often do you have a drink containing alcohol? 0 Filed at: 11/19/2023 2344   2. How many drinks containing alcohol do you have on a typical day you are drinking? 0 Filed at: 11/19/2023 2344   3a. Male UNDER 65: How often do you have five or more drinks on one occasion? 0 Filed at: 11/19/2023 2344   Audit-C Score 0 Filed at: 11/19/2023 2344   KARTIK: How many times in the past year have you. .. Used an illegal drug or used a prescription medication for non-medical reasons? Never Filed at: 11/19/2023 2344                  Medical Decision Making  Risk  OTC drugs. Prescription drug management. Disposition  Final diagnoses:   Lissette Crain     Time reflects when diagnosis was documented in both MDM as applicable and the Disposition within this note       Time User Action Codes Description Comment    11/19/2023 11:57 PM Zeeshan Carter Add [K34.26] Lissette Crain           ED Disposition       ED Disposition   Discharge    Condition   Stable    Date/Time   Sun Nov 19, 2023 [id number] discharge to home/self care.                    Follow-up Information       Follow up With Specialties Details Why [address] [facility], DO Internal Medicine Schedule an appointment as soon as possible for a visit   [address] [place], [place] [address]maria isabel [facility]  [phone]              Patient's Medications   Discharge Prescriptions AMOXICILLIN-CLAVULANATE (AUGMENTIN) 875-125 MG PER TABLET    Take 1 tablet by mouth every 12 (twelve) hours for 7 days       Start Date: 11/20/2023End Date: 11/27/2023       Order Dose: 1 tablet       Quantity: 14 tablet    Refills: 0    NAPROXEN (NAPROSYN) 500 MG TABLET    Take 1 tablet (500 mg total) by mouth 2 (two) times a day with meals       Start Date: 11/19/2023End Date: --       Order Dose: 500 mg       Quantity: 30 tablet    Refills: 0     No discharge procedures on file. PDMP Review       None             ED Provider  Attending physically available and evaluated Celine Núñez. I managed the patient along with the ED Attending.     Electronically Signed by           Prasanna Herman DO  11/20/23 3806

## 2023-11-20 NOTE — DISCHARGE INSTRUCTIONS
Take antibiotics as prescribed. Continue to take Tylenol and Naprosyn for discomfort. Return to the ED with any new/concerning issues.